# Patient Record
Sex: MALE | Race: OTHER
[De-identification: names, ages, dates, MRNs, and addresses within clinical notes are randomized per-mention and may not be internally consistent; named-entity substitution may affect disease eponyms.]

---

## 2018-07-17 ENCOUNTER — HOSPITAL ENCOUNTER (EMERGENCY)
Dept: HOSPITAL 25 - ED | Age: 23
LOS: 1 days | Discharge: HOME | End: 2018-07-18
Payer: SELF-PAY

## 2018-07-17 DIAGNOSIS — R10.31: Primary | ICD-10-CM

## 2018-07-17 DIAGNOSIS — R19.7: ICD-10-CM

## 2018-07-17 LAB
BASOPHILS # BLD AUTO: 0 10^3/UL (ref 0–0.2)
EOSINOPHIL # BLD AUTO: 0.1 10^3/UL (ref 0–0.6)
HCT VFR BLD AUTO: 47 % (ref 42–52)
HGB BLD-MCNC: 16.2 G/DL (ref 14–18)
LYMPHOCYTES # BLD AUTO: 1.8 10^3/UL (ref 1–4.8)
MCH RBC QN AUTO: 29 PG (ref 27–31)
MCHC RBC AUTO-ENTMCNC: 34 G/DL (ref 31–36)
MCV RBC AUTO: 84 FL (ref 80–94)
MONOCYTES # BLD AUTO: 0.6 10^3/UL (ref 0–0.8)
NEUTROPHILS # BLD AUTO: 4.8 10^3/UL (ref 1.5–7.7)
NRBC # BLD AUTO: 0 10^3/UL
NRBC BLD QL AUTO: 0.2
PLATELET # BLD AUTO: 277 10^3/UL (ref 150–450)
RBC # BLD AUTO: 5.61 10^6/UL (ref 4–5.4)
WBC # BLD AUTO: 7.2 10^3/UL (ref 3.5–10.8)

## 2018-07-17 PROCEDURE — 83690 ASSAY OF LIPASE: CPT

## 2018-07-17 PROCEDURE — 76705 ECHO EXAM OF ABDOMEN: CPT

## 2018-07-17 PROCEDURE — 36415 COLL VENOUS BLD VENIPUNCTURE: CPT

## 2018-07-17 PROCEDURE — 85025 COMPLETE CBC W/AUTO DIFF WBC: CPT

## 2018-07-17 PROCEDURE — 86140 C-REACTIVE PROTEIN: CPT

## 2018-07-17 PROCEDURE — 80053 COMPREHEN METABOLIC PANEL: CPT

## 2018-07-17 PROCEDURE — 99283 EMERGENCY DEPT VISIT LOW MDM: CPT

## 2018-07-17 PROCEDURE — 83605 ASSAY OF LACTIC ACID: CPT

## 2018-07-17 NOTE — ED
GI/ HPI





- HPI Summary


HPI Summary: 


22-year-old male presents with abdominal pain for the past day.  He states that 

it started with diarrhea.  He denies any nausea vomiting.  He denies anyone 

else being sick.  No previous belly surgeries.  No fevers.  No pain with 

urination.  No flank pain.  He has never had this before.  Pain has since 

resolved.  He has minimal pain at this point.  Pain is located in his right 

lower quadrant.  States it feels almost like gas.








- History of Current Complaint


Chief Complaint: EDAbdPain


Time Seen by Provider: 07/17/18 23:22


Stated Complaint: STOMACHE PAINS


Pain Intensity: 5





- Allergy/Home Medications


Allergies/Adverse Reactions: 


 Allergies











Allergy/AdvReac Type Severity Reaction Status Date / Time


 


No Known Allergies Allergy   Verified 07/17/18 23:29











Home Medications: 


 Home Medications





NK [No Home Medications Reported]  07/17/18 [History Confirmed 07/17/18]











PMH/Surg Hx/FS Hx/Imm Hx


Endocrine/Hematology History: 


   Denies: Hx Anticoagulant Therapy


Cardiovascular History: 


   Denies: Hx Hypertension


Infectious Disease History: No


Infectious Disease History: 


   Denies: Traveled Outside the [place] in Last 30 Days





- Family History


Known Family History: 


   Negative: Diabetes





- Social History


Alcohol Use: None


Substance Use Type: Reports: None


Smoking Status (MU): Never Smoked Tobacco





Review of Systems


Negative: Fever


Negative: Chest Pain


Negative: Shortness Of Breath


Positive: Abdominal Pain, Diarrhea.  Negative: Vomiting, Nausea


All Other Systems Reviewed And Are Negative: Yes





Physical Exam


Triage Information Reviewed: Yes


Vital Signs On Initial Exam: 


 Initial Vitals











Temp Pulse Resp BP Pulse Ox


 


 98.5 F   56   15   138/91   99 


 


 07/17/18 20:24  07/17/18 20:24  07/17/18 20:24  07/17/18 20:24  07/17/18 20:24











Vital Signs Reviewed: Yes


Appearance: Positive: Well-Appearing


Skin: Positive: Warm, Dry


Head/Face: Positive: Normal Head/Face Inspection


Eyes: Positive: Normal, Conjunctiva Clear


ENT: Positive: Pharynx normal


Respiratory/Lung Sounds: Positive: Clear to Auscultation, Breath Sounds Present


Cardiovascular: Positive: Normal, RRR


Abdomen Description: Positive: Nontender, Soft, Other: - neg obtruartor


Bowel Sounds: Positive: Present


Musculoskeletal: Positive: Normal


Neurological: Positive: Normal


Psychiatric: Positive: Normal





Diagnostics





- Vital Signs


 Vital Signs











  Temp Pulse Resp BP Pulse Ox


 


 07/17/18 23:28  98.6 F    


 


 07/17/18 23:26   72   145/98  100


 


 07/17/18 22:34  98.5 F  57  15  125/82  100


 


 07/17/18 20:24  98.5 F  56  15  138/91  99














- Laboratory


Lab Results: 


 Lab Results











  07/17/18 07/17/18 07/17/18 Range/Units





  21:16 21:16 21:16 


 


WBC  7.2    (3.5-10.8)  10^3/ul


 


RBC  5.61 H    (4.00-5.40)  10^6/ul


 


Hgb  16.2    (14.0-18.0)  g/dl


 


Hct  47    (42-52)  %


 


MCV  84    (80-94)  fL


 


MCH  29    (27-31)  pg


 


MCHC  34    (31-36)  g/dl


 


RDW  13    (10.5-15)  %


 


Plt Count  277    (150-450)  10^3/ul


 


MPV  7.5    (7.4-10.4)  um3


 


Neut % (Auto)  65.8    (38-83)  %


 


Lymph % (Auto)  24.5 L    (25-47)  %


 


Mono % (Auto)  8.2 H    (0-7)  %


 


Eos % (Auto)  0.9    (0-6)  %


 


Baso % (Auto)  0.6    (0-2)  %


 


Absolute Neuts (auto)  4.8    (1.5-7.7)  10^3/ul


 


Absolute Lymphs (auto)  1.8    (1.0-4.8)  10^3/ul


 


Absolute Monos (auto)  0.6    (0-0.8)  10^3/ul


 


Absolute Eos (auto)  0.1    (0-0.6)  10^3/ul


 


Absolute Basos (auto)  0    (0-0.2)  10^3/ul


 


Absolute Nucleated RBC  0    10^3/ul


 


Nucleated RBC %  0.2    


 


Sodium   139   (135-145)  mmol/L


 


Potassium   3.7   (3.5-5.0)  mmol/L


 


Chloride   105   (101-111)  mmol/L


 


Carbon Dioxide   26   (22-32)  mmol/L


 


Anion Gap   8   (2-11)  mmol/L


 


BUN   18   (6-24)  mg/dL


 


Creatinine   0.89   (0.67-1.17)  mg/dL


 


Est GFR ( Amer)   128.2   (>60)  


 


Est GFR (Non-Af Amer)   105.9   (>60)  


 


BUN/Creatinine Ratio   20.2 H   (8-20)  


 


Glucose   93   ()  mg/dL


 


Lactic Acid    0.6  (0.5-2.0)  mmol/L


 


Calcium   9.5   (8.6-10.3)  mg/dL


 


Total Bilirubin   1.10 H   (0.2-1.0)  mg/dL


 


AST   19   (13-39)  U/L


 


ALT   14   (7-52)  U/L


 


Alkaline Phosphatase   73   ()  U/L


 


C-Reactive Protein   < 1.00   (<8.01)  mg/L


 


Total Protein   7.4   (6.4-8.9)  g/dL


 


Albumin   4.7   (3.2-5.2)  g/dL


 


Globulin   2.7   (2-4)  g/dL


 


Albumin/Globulin Ratio   1.7   (1-3)  


 


Lipase   13   (11.0-82.0)  U/L











Result Diagrams: 


 07/17/18 21:16





 07/17/18 21:16


Lab Statement: Any lab studies that have been ordered have been reviewed, and 

results considered in the medical decision making process.





GIGU Course/Dx





- Course


Course Of Treatment: 22-year-old male presents with abdominal pain for the past 

day.  He states that it started with diarrhea.  He denies any nausea vomiting.  

He denies anyone else being sick.  No previous belly surgeries.  No fevers.  No 

pain with urination.  No flank pain.  He has never had this before.  Pain has 

since resolved.  He has minimal pain at this point.  Pain is located in his 

right lower quadrant.  States it feels almost like gas.  On exam abdomen soft 

nontender.  Labs white blood count and CRP normal.  Ultrasound did not show 

appendix.  Discuss with patient that we will have sent home and have to return 

to ED if pain returns.  Friend was translating.  Patient understands agrees the 

plan.





- Diagnoses


Differential Diagnoses - Male: Appendicitis, Diarrhea, Urinary Tract Infection


Provider Diagnoses: 


 Abdominal pain








Discharge





- Sign-Out/Discharge


Documenting (check all that apply): Patient Departure





- Discharge Plan


Condition: Good


Disposition: HOME


Patient Education Materials:  Acute Abdominal Pain (ED)


Print Language: [place]e


Referrals: 


[facility] [place] PHYSICIAN REFERRAL [Outside]


Additional Instructions: 


Drink small amounts of fluid as tolerated


When able to eat follow BRAT diet: Bananas, rice, applesauce, toast


Take ibuprofen or Tylenol for pain as needed every 6 hours


Establish care with primary


Return to ED if develop any persistent pain in RLQ, fever, vomiting, or any new 

or worsening symptoms





- Billing Disposition and Condition


Condition: GOOD


Disposition: Home

## 2018-07-18 VITALS — DIASTOLIC BLOOD PRESSURE: 80 MMHG | SYSTOLIC BLOOD PRESSURE: 121 MMHG

## 2018-07-18 NOTE — RAD
Indication: Right lower quadrant pain.



Real-time sonography of the right lower quadrant was performed. The study was performed

utilizing graded compression sonography in high frequency linear transducer.



Appendix is not visualized. No free fluid is identified.



IMPRESSION: Appendix not visualized.